# Patient Record
Sex: FEMALE | Race: BLACK OR AFRICAN AMERICAN | NOT HISPANIC OR LATINO
[De-identification: names, ages, dates, MRNs, and addresses within clinical notes are randomized per-mention and may not be internally consistent; named-entity substitution may affect disease eponyms.]

---

## 2018-07-12 ENCOUNTER — RESULT REVIEW (OUTPATIENT)
Age: 28
End: 2018-07-12

## 2019-05-04 ENCOUNTER — EMERGENCY (EMERGENCY)
Facility: HOSPITAL | Age: 29
LOS: 1 days | Discharge: HOME | End: 2019-05-04
Attending: EMERGENCY MEDICINE
Payer: SELF-PAY

## 2019-05-04 VITALS
HEART RATE: 98 BPM | SYSTOLIC BLOOD PRESSURE: 121 MMHG | OXYGEN SATURATION: 99 % | RESPIRATION RATE: 16 BRPM | WEIGHT: 125 LBS | HEIGHT: 62 IN | DIASTOLIC BLOOD PRESSURE: 73 MMHG | TEMPERATURE: 98 F

## 2019-05-04 VITALS
SYSTOLIC BLOOD PRESSURE: 110 MMHG | DIASTOLIC BLOOD PRESSURE: 82 MMHG | OXYGEN SATURATION: 100 % | RESPIRATION RATE: 16 BRPM | HEART RATE: 89 BPM

## 2019-05-04 PROCEDURE — 99284 EMERGENCY DEPT VISIT MOD MDM: CPT

## 2019-05-04 PROCEDURE — 96375 TX/PRO/DX INJ NEW DRUG ADDON: CPT

## 2019-05-04 PROCEDURE — 96374 THER/PROPH/DIAG INJ IV PUSH: CPT

## 2019-05-04 PROCEDURE — 99284 EMERGENCY DEPT VISIT MOD MDM: CPT | Mod: 25

## 2019-05-04 RX ORDER — SODIUM CHLORIDE 9 MG/ML
1000 INJECTION INTRAMUSCULAR; INTRAVENOUS; SUBCUTANEOUS ONCE
Qty: 0 | Refills: 0 | Status: COMPLETED | OUTPATIENT
Start: 2019-05-04 | End: 2019-05-04

## 2019-05-04 RX ORDER — EPINEPHRINE 0.3 MG/.3ML
0.3 INJECTION INTRAMUSCULAR; SUBCUTANEOUS
Qty: 2 | Refills: 0 | OUTPATIENT
Start: 2019-05-04

## 2019-05-04 RX ORDER — FAMOTIDINE 10 MG/ML
20 INJECTION INTRAVENOUS ONCE
Qty: 0 | Refills: 0 | Status: COMPLETED | OUTPATIENT
Start: 2019-05-04 | End: 2019-05-04

## 2019-05-04 RX ADMIN — FAMOTIDINE 20 MILLIGRAM(S): 10 INJECTION INTRAVENOUS at 19:28

## 2019-05-04 RX ADMIN — SODIUM CHLORIDE 1000 MILLILITER(S): 9 INJECTION INTRAMUSCULAR; INTRAVENOUS; SUBCUTANEOUS at 19:54

## 2019-05-04 RX ADMIN — Medication 125 MILLIGRAM(S): at 19:54

## 2019-05-04 NOTE — ED PROVIDER NOTE - ATTENDING CONTRIBUTION TO CARE
Patient is a 27 yo F with no chronic medical problems, on no medications, no known allergies here for allergic reaction that started about 30 minutes ago (around 6:45 PM). Patient states she had cashews for the first time, immediately felt like her tongue was getting bigger and itchy and developed hives. She immediately took 50 mg of PO benadryl and called 911. She denies shortness of breath, nausea, vomiting, diarrhea. She states she has had nuts, peanuts before. Denies family members with a nut allergy. Patient immediately evaluated upon arrival.     VS noted  Gen. no acute distress, Non toxic   HEENT: EOMI, mmm, airway open, no uvula swelling, no stridor, no tongue swelling, no lip swelling, bilateral ears appear to be edematous  Lungs: CTAB/L no C/ W /R  CVS: tachycardic  Abd; Soft non tender, non distended   Ext: no edema  Skin: hives on neck, face, ears, chest, bilateral arms  Neuro AAOx3 non focal clear speech  a/p: allergic reaction to cashew nut - will give steroids, pepcid, observe. No EPI at this time.  - Ludivina WHITING Patient is a 27 yo F with no chronic medical problems, on no medications, no known allergies here for allergic reaction that started about 30 minutes ago (around 6:45 PM). Patient states she had cashews for the first time, immediately felt like her tongue was getting bigger and itchy and developed hives. She immediately took 50 mg of PO benadryl and called 911. She denies shortness of breath, nausea, vomiting, diarrhea. She states she has had nuts, peanuts before. Denies family members with a nut allergy. Patient immediately evaluated upon arrival.     VS noted  Gen. no acute distress, Non toxic   HEENT: EOMI, mmm, airway open, no uvula swelling, no stridor, no tongue swelling, no lip swelling, bilateral ears appear to be edematous  Lungs: CTAB/L no C/ W /R  CVS: tachycardic  Abd; Soft non tender, non distended   Ext: no edema  Skin: hives on neck, face, ears, chest, bilateral arms  Neuro AAOx3 non focal clear speech  a/p: allergic reaction to cashew nut - will give steroids, pepcid, observe. No EPI at this time. Advised to discontinue eating all nuts until evaluated by an allergist.   - Ludivina WHITING

## 2019-05-04 NOTE — ED PROVIDER NOTE - NSFOLLOWUPCLINICS_GEN_ALL_ED_FT
[facility] Allergy and Immunology  Allergy  [address] [place] [address]  Phone: [phone]  Fax:   Follow Up Time: 1-3 Days

## 2019-05-04 NOTE — ED PROVIDER NOTE - NSFOLLOWUPINSTRUCTIONS_ED_ALL_ED_FT
- 2 prescriptions have been sent to your pharmacy - please take as directed   - You may continue to use Benadryl if you have any symptoms of allergic reaction.  - Please follow up with an allergist - contact information provided above  - Please also follow up with your Primary doctor in 2-3 days  - Please read the included materials about epi injections at home

## 2019-05-04 NOTE — ED PROVIDER NOTE - OBJECTIVE STATEMENT
28F no pmh p/w allergic reaction.  Pt was eating cashews and immediately broke out in hives and felt a tightening of her throat.  She took 2 benadryl prior to arrival and states her throat is feeling better.  Pt never had nausea/vomiting, wheezing, abd pain.  Pt has never had an allergic reaction before.  Denies feeling any lip or tongue swelling. 28F no pmh p/w allergic reaction starting around 6:45 tonight.  Pt was eating cashews and immediately broke out in hives and felt a tightening of her throat.  She took 2 benadryl prior to arrival and states her throat is feeling better.  Pt never had nausea/vomiting, wheezing, abd pain.  Pt has never had an allergic reaction before.  Denies feeling any lip or tongue swelling.

## 2019-05-04 NOTE — ED PROVIDER NOTE - PHYSICAL EXAMINATION
Gen: NAD, AOx3, visible hives to neck and face, no obvious lip, tongue or mouth swelling.  Pt speaking in full sentences and comfortable appearing.  Head: NCAT  HEENT: PERRL, oral mucosa moist, normal conjunctiva, no swelling to oropharynx, lips/tongue  Lung: CTAB, no wheezing, no respiratory distress  CV: mild tachycardia, no murmurs, Normal perfusion  Abd: soft, NTND  MSK: No edema, no visible deformities  Neuro: No focal neurologic deficits  Skin: scattered urticaria to arms, neck, ears, face.    Psych: normal affect

## 2019-05-04 NOTE — ED ADULT NURSE NOTE - OBJECTIVE STATEMENT
Pt is a 28YOF no known medical hx received ambulatory through triage A&Ox3 complaining of allergic reaction. PT states that she was eating cashews and she immediately broke out in hives and felt a tightening in her throat. PT states that she took 2 benadryl prior to arrival and noted that her throat is no longer tight. Pt denies any tongue swelling, difficulty breathing or SOB. Pt denies ever having an allergic reaction in the past. PT has hives to b/l arms neck and chin

## 2019-05-04 NOTE — ED PROVIDER NOTE - PROGRESS NOTE DETAILS
Pt is feeling much better, hives have improved and patient is not feeling any throat tightness or swelling.  Will obs for total of 4 hours from beginning of allergic reaction.  - Leena Bose,  Pt feeling well.  No return of allergic sympotms.  Will d/c with allergist f/u as well as rx for epi pen and steroids.  - Leena Bose, DO

## 2019-06-17 PROBLEM — Z78.9 OTHER SPECIFIED HEALTH STATUS: Chronic | Status: ACTIVE | Noted: 2019-05-04

## 2019-07-22 ENCOUNTER — APPOINTMENT (OUTPATIENT)
Dept: PEDIATRIC ALLERGY IMMUNOLOGY | Facility: CLINIC | Age: 29
End: 2019-07-22
Payer: COMMERCIAL

## 2019-07-22 ENCOUNTER — LABORATORY RESULT (OUTPATIENT)
Age: 29
End: 2019-07-22

## 2019-07-22 VITALS — HEIGHT: 68.5 IN | WEIGHT: 145.59 LBS | BODY MASS INDEX: 21.81 KG/M2 | HEART RATE: 84 BPM

## 2019-07-22 DIAGNOSIS — T78.1XXA OTHER ADVERSE FOOD REACTIONS, NOT ELSEWHERE CLASSIFIED, INITIAL ENCOUNTER: ICD-10-CM

## 2019-07-22 DIAGNOSIS — H10.10 ACUTE ATOPIC CONJUNCTIVITIS, UNSPECIFIED EYE: ICD-10-CM

## 2019-07-22 DIAGNOSIS — Z91.018 ALLERGY TO OTHER FOODS: ICD-10-CM

## 2019-07-22 DIAGNOSIS — R09.82 POSTNASAL DRIP: ICD-10-CM

## 2019-07-22 DIAGNOSIS — J30.1 ALLERGIC RHINITIS DUE TO POLLEN: ICD-10-CM

## 2019-07-22 DIAGNOSIS — J30.89 OTHER ALLERGIC RHINITIS: ICD-10-CM

## 2019-07-22 PROCEDURE — 99244 OFF/OP CNSLTJ NEW/EST MOD 40: CPT | Mod: 25

## 2019-07-22 PROCEDURE — 95004 PERQ TESTS W/ALRGNC XTRCS: CPT

## 2019-07-22 PROCEDURE — 36415 COLL VENOUS BLD VENIPUNCTURE: CPT

## 2019-07-22 RX ORDER — KETOTIFEN FUMARATE 0.25 MG/ML
0.03 SOLUTION/ DROPS OPHTHALMIC
Qty: 1 | Refills: 3 | Status: ACTIVE | COMMUNITY
Start: 2019-07-22 | End: 1900-01-01

## 2019-07-22 RX ORDER — CAMPHOR 0.45 %
25 GEL (GRAM) TOPICAL
Qty: 1 | Refills: 0 | Status: ACTIVE | COMMUNITY
Start: 2019-07-22 | End: 1900-01-01

## 2019-07-22 RX ORDER — FLUTICASONE PROPIONATE 50 UG/1
50 SPRAY, METERED NASAL DAILY
Qty: 1 | Refills: 2 | Status: ACTIVE | COMMUNITY
Start: 2019-07-22 | End: 1900-01-01

## 2019-07-22 RX ORDER — FEXOFENADINE HYDROCHLORIDE 180 MG/1
180 TABLET ORAL
Qty: 1 | Refills: 2 | Status: ACTIVE | COMMUNITY
Start: 2019-07-22 | End: 1900-01-01

## 2019-07-22 NOTE — REASON FOR VISIT
[Initial Consultation] : an initial consultation for [FreeTextEntry2] : allergic reaction to food/food allergy, chronic rhinitis, itchy eyes

## 2019-07-22 NOTE — SOCIAL HISTORY
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

## 2019-07-22 NOTE — REVIEW OF SYSTEMS
[Nasal Congestion] : nasal congestion [Rhinorrhea] : rhinorrhea [Eye Itching] : itchy eyes [Post Nasal Drip] : post nasal drip [Sneezing] : sneezing [Nl] : Endocrine [Immunizations are up to date] : Immunizations are up to date

## 2019-07-22 NOTE — CONSULT LETTER
[Dear  ___] : Dear  [unfilled], [Consult Letter:] : I had the pleasure of evaluating your patient, [unfilled]. [Consult Closing:] : Thank you very much for allowing me to participate in the care of this patient.  If you have any questions, please do not hesitate to contact me. [Please see my note below.] : Please see my note below. [Sincerely,] : Sincerely, [FreeTextEntry2] : Dr. MERLY OLIVEIRA, [FreeTextEntry3] : Missy Orellana MD\par Attending Physician, Division of Allergy and Immunology\par , Departments of Medicine and Pediatrics\par [employer] [facility]\par [facility] \par [facility]

## 2019-07-22 NOTE — PHYSICAL EXAM
[Well Nourished] : well nourished [Alert] : alert [Healthy Appearance] : healthy appearance [No Acute Distress] : no acute distress [Well Developed] : well developed [Normal Pupil & Iris Size/Symmetry] : normal pupil and iris size and symmetry [No Discharge] : no discharge [No Photophobia] : no photophobia [Sclera Not Icteric] : sclera not icteric [Normal TMs] : both tympanic membranes were normal [Conjunctival Erythema] : no conjunctival erythema [Normal Lips/Tongue] : the lips and tongue were normal [Normal Outer Ear/Nose] : the ears and nose were normal in appearance [Normal Tonsils] : normal tonsils [No Thrush] : no thrush [Normal Dentition] : normal dentition [No Oral Lesions or Ulcers] : no oral lesions or ulcers [Boggy Nasal Turbinates] : boggy and/or pale nasal turbinates [Exudate] : no exudate [Pharyngeal erythema] : no pharyngeal erythema [Posterior Pharyngeal Cobblestoning] : posterior pharyngeal cobblestoning [Clear Rhinorrhea] : no clear rhinorrhea was seen [No Neck Mass] : no neck mass was observed [No LAD] : no lymphadenopathy [Supple] : the neck was supple [No Crackles] : no crackles [Normal Rate and Effort] : normal respiratory rhythm and effort [Bilateral Audible Breath Sounds] : bilateral audible breath sounds [No Retractions] : no retractions [Wheezing] : no wheezing was heard [Normal Rate] : heart rate was normal  [Normal S1, S2] : normal S1 and S2 [No murmur] : no murmur [Soft] : abdomen soft [Regular Rhythm] : with a regular rhythm [Not Distended] : not distended [Normal Cervical Lymph Nodes] : cervical [No Rash] : no rash [Skin Intact] : skin intact  [Normal Axillary Lumph Nodes] : axillary [No Skin Lesions] : no skin lesions [No Joint Swelling or Erythema] : no joint swelling or erythema [No Edema] : no edema [No clubbing] : no clubbing [No Cyanosis] : no cyanosis [Normal Mood] : mood was normal [Normal Affect] : affect was normal [Alert, Awake, Oriented as Age-Appropriate] : alert, awake, oriented as age appropriate

## 2019-07-22 NOTE — HISTORY OF PRESENT ILLNESS
[Eczematous rashes] : eczematous rashes [Asthma] : asthma [de-identified] : 28 year old female presents in initial consultation for allergic reaction to food/food allergy, chronic rhinitis, itchy eyes:\par \par Food allergy:\par 2 months ago patient reportedly developed itchy month, lip swelling and hives on her arms shortly after eating cashews. She took Benadryl, went to the ER where she was given IVF.\par Patient continues peanut in her diet since her reaction to cashew with no notable adverse reaction.\par She has avoided tree nuts since then, and doesn't recall prior consumption of tree nuts..\par She reportedly tolerates cow' milk/dairy, egg, wheat, soy, fish and shellfish with no notable adverse reaction. \par \par Chronic rhinitis, itchy eyes, postnasal drip:\par The patient reports h/o nasal congestion, rhinorrhea, sneezing and itching of the eyes, noticed mostly during spring. Patient has not tried any allergy medications for her symptoms before.\par \par Never stung by bee or wasp.

## 2019-07-22 NOTE — IMPRESSION
[Allergy Testing Cockroach] : cockroach [Allergy Testing Trees] : trees [Allergy Testing Dust Mite] : dust mites [Allergy Testing Mixed Feathers] : feathers [Allergy Testing Dog] : dog [Allergy Testing Cat] : cat [] : molds [Allergy Testing Weeds] : weeds [Allergy Testing Grasses] : grasses [________] : [unfilled]

## 2019-07-29 LAB
ALMOND IGE QN: 0.35 KUA/L
BRAZIL NUT IGE QN: 0.12 KUA/L
CASHEW NUT IGE QN: 0.47 KUA/L
DEPRECATED ALMOND IGE RAST QL: 1
DEPRECATED BRAZIL NUT IGE RAST QL: NORMAL
DEPRECATED CASHEW NUT IGE RAST QL: 1
DEPRECATED HAZELNUT IGE RAST QL: NORMAL
DEPRECATED PECAN/HICK TREE IGE RAST QL: 0
DEPRECATED PINE NUT IGE RAST QL: 1
DEPRECATED PISTACHIO IGE RAST QL: 0.94 KUA/L
DEPRECATED WALNUT IGE RAST QL: 1
E ANA O3 STORAGE PROTEIN CASHEW (F443) CLASS: ABNORMAL (ref 0–?)
E ANA O3 STORAGE PROTEIN CASHEW (F443) CONC: 0.2 KUA/L
HAZELNUT IGE QN: 0.31 KUA/L
PECAN/HICK TREE IGE QN: <0.1 KUA/L
PINE NUT IGE QN: 0.39 KUA/L
PISTACHIO IGE QN: 2
R COR A1 PR-10 HAZELNUT (F428) CLASS: 0 (ref 0–?)
R COR A1 PR-10 HAZELNUT (F428) CONC: <0.1 KUA/L
R COR A14 HAZELNUT (F439) CLASS: 0 (ref 0–?)
R COR A14 HAZELNUT (F439) CONC: <0.1 KUA/L
R COR A8 LTP HAZELNUT (F425) CLASS: 0 (ref 0–?)
R COR A8 LTP HAZELNUT (F425) CONC: <0.1 KUA/L
R COR A9 HAZELNUT (F440) CLASS: 0 (ref 0–?)
R COR A9 HAZELNUT (F440) CONC: <0.1 KUA/L
R JUG R1 STORAGE PROTEIN WALNUT (F441) CLASS: 0 (ref 0–?)
R JUG R1 STORAGE PROTEIN WALNUT (F441) CONC: <0.1 KUA/L
R JUG R3 LPT WALNUT (F442) CLASS: 0 (ref 0–?)
R JUG R3 LPT WALNUT (F442) CONC: <0.1 KUA/L
WALNUT IGE QN: 0.38 KUA/L

## 2019-08-22 ENCOUNTER — APPOINTMENT (OUTPATIENT)
Dept: INTERNAL MEDICINE | Facility: CLINIC | Age: 29
End: 2019-08-22

## 2019-10-18 NOTE — ED PROVIDER NOTE - CLINICAL SUMMARY MEDICAL DECISION MAKING FREE TEXT BOX
Left VM message that recent labs from last check up were ok.  I left a message that the family could call with questions or concerns.    
Healthy 27y/o F p/w allergic reaction after eating cashews.  She originally had hives and throat tightening and took 2 benadryl prior to arrival with improvement of symptoms.  Pt with no obvious oropharyngeal swelling, breathing comfortably, but with obvious hives to arms/neck/face.  will give IV steroids, pepcid, fluids and close observation.

## 2020-09-21 ENCOUNTER — RESULT REVIEW (OUTPATIENT)
Age: 30
End: 2020-09-21
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
## 2022-03-09 ENCOUNTER — RESULT REVIEW (OUTPATIENT)
Age: 32
End: 2022-03-09

## 2022-03-09 ENCOUNTER — APPOINTMENT (OUTPATIENT)
Dept: OBGYN | Facility: CLINIC | Age: 32
End: 2022-03-09
Payer: COMMERCIAL

## 2022-03-09 PROCEDURE — 81002 URINALYSIS NONAUTO W/O SCOPE: CPT

## 2022-03-09 PROCEDURE — 99000 SPECIMEN HANDLING OFFICE-LAB: CPT

## 2022-03-09 PROCEDURE — 99395 PREV VISIT EST AGE 18-39: CPT | Mod: 25

## 2023-03-15 ENCOUNTER — TRANSCRIPTION ENCOUNTER (OUTPATIENT)
Age: 33
End: 2023-03-15

## 2023-03-15 ENCOUNTER — APPOINTMENT (OUTPATIENT)
Dept: OBGYN | Facility: CLINIC | Age: 33
End: 2023-03-15
Payer: COMMERCIAL

## 2023-03-15 PROCEDURE — 81002 URINALYSIS NONAUTO W/O SCOPE: CPT

## 2023-03-15 PROCEDURE — 99395 PREV VISIT EST AGE 18-39: CPT

## 2023-03-27 RX ORDER — EPINEPHRINE 0.3 MG/.3ML
0.3 INJECTION INTRAMUSCULAR
Qty: 2 | Refills: 2 | Status: ACTIVE | COMMUNITY
Start: 1900-01-01 | End: 1900-01-01

## 2023-12-26 ENCOUNTER — EMERGENCY (EMERGENCY)
Facility: HOSPITAL | Age: 33
LOS: 1 days | Discharge: HOME | End: 2023-12-26
Attending: STUDENT IN AN ORGANIZED HEALTH CARE EDUCATION/TRAINING PROGRAM
Payer: COMMERCIAL

## 2023-12-26 VITALS
RESPIRATION RATE: 18 BRPM | HEIGHT: 69 IN | SYSTOLIC BLOOD PRESSURE: 141 MMHG | WEIGHT: 130.07 LBS | TEMPERATURE: 98 F | HEART RATE: 94 BPM | DIASTOLIC BLOOD PRESSURE: 97 MMHG | OXYGEN SATURATION: 100 %

## 2023-12-26 PROCEDURE — 99284 EMERGENCY DEPT VISIT MOD MDM: CPT

## 2023-12-27 VITALS
DIASTOLIC BLOOD PRESSURE: 80 MMHG | OXYGEN SATURATION: 100 % | SYSTOLIC BLOOD PRESSURE: 129 MMHG | TEMPERATURE: 98 F | RESPIRATION RATE: 16 BRPM | HEART RATE: 80 BPM

## 2023-12-27 PROCEDURE — 99283 EMERGENCY DEPT VISIT LOW MDM: CPT | Mod: 25

## 2023-12-27 PROCEDURE — 73130 X-RAY EXAM OF HAND: CPT

## 2023-12-27 PROCEDURE — 73130 X-RAY EXAM OF HAND: CPT | Mod: 26,RT

## 2023-12-27 RX ORDER — IBUPROFEN 200 MG
400 TABLET ORAL ONCE
Refills: 0 | Status: COMPLETED | OUTPATIENT
Start: 2023-12-27 | End: 2023-12-27

## 2023-12-27 RX ORDER — METHOCARBAMOL 500 MG/1
2 TABLET, FILM COATED ORAL
Qty: 18 | Refills: 0
Start: 2023-12-27 | End: 2023-12-29

## 2023-12-27 RX ORDER — METHOCARBAMOL 500 MG/1
1500 TABLET, FILM COATED ORAL ONCE
Refills: 0 | Status: COMPLETED | OUTPATIENT
Start: 2023-12-27 | End: 2023-12-27

## 2023-12-27 RX ADMIN — Medication 400 MILLIGRAM(S): at 03:09

## 2023-12-27 RX ADMIN — METHOCARBAMOL 1500 MILLIGRAM(S): 500 TABLET, FILM COATED ORAL at 03:09
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
## 2023-12-27 NOTE — ED PROVIDER NOTE - PATIENT PORTAL LINK FT
You can access the FollowMyHealth Patient Portal offered by [facility] by registering at the following website: [email or web address]. By joining [facility]’s FollowMyHealth portal, you will also be able to view your health information using other applications (apps) compatible with our system. You can access the FollowMyHealth Patient Portal offered by [facility] by registering at the following website: [email or web address]. By joining [employer]’s FollowMyHealth portal, you will also be able to view your health information using other applications (apps) compatible with our system.
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
## 2023-12-27 NOTE — ED PROVIDER NOTE - PROGRESS NOTE DETAILS
Ben Gasca) [facility] PGY-2: Reeval patient after medication pain significantly improved.  Patient will be discharged Ben Gasca) [facility] PGY-2: Reeval patient after medication pain significantly improved.  Patient will be discharged

## 2023-12-27 NOTE — ED PROVIDER NOTE - PHYSICAL EXAMINATION
GENERAL: Alert. No acute distress.   EYES: EOMI grossly normal. Anicteric.   HENT: Moist mucous membranes.   RESP: No conversation dyspnea, no resp distress, CTAB   CARDIOVASCULAR: RRR, no m/g/r  ABDOMEN: soft, non distended, nttp  MSK: ROM grossly normal in all 4 extremities. No deformities  SKIN: warm and dry  NEUROLOGIC: Alert and oriented x3. 5/5 strength in all 4 extremities. Sensation grossly intact in all 4 extremities.   PSYCHIATRIC: Cooperative. Appropriate mood and affect

## 2023-12-27 NOTE — ED PROVIDER NOTE - NS ED ROS FT
CONSTITUTIONAL: No fever or chill  HEENT: Denies changes in vision and hearing.  RESPIRATORY: Denies SOB and cough.  CV: Deneis CP.   GI: Denies abdominal pain, nausea, vomiting and diarrhea.  MSK:   Neck pain, back pain, right hand pain  SKIN: Denies rash   NEUROLOGICAL: Denies headache and syncope.

## 2023-12-27 NOTE — ED PROVIDER NOTE - NSFOLLOWUPINSTRUCTIONS_ED_ALL_ED_FT
You can use 500-1000mg Tylenol every 6 hours for pain - as needed; maximal daily dose 3000mg.  This is an over-the-counter medications - please respect the warnings on the label. This medication come with certain risks and side effects that you need to discuss with your doctor, especially if you are taking it for a prolonged period.    You can use 400-600mg Ibuprofen (such as motrin or advil) every 6 to 8 hours as needed for pain control.  Take ibuprofen with food or milk to lessen stomach upset.  This is an over-the-counter medication please respect the warnings on the label. All medications come with certain risks and side effects that you need to discuss with your doctor, especially if you are taking them for a prolonged period.     if you continue to have back pain, follow-up with the  spine doctor that performed your surgery    ============================================================  Back Pain    Back pain is very common in adults. The cause of back pain is rarely dangerous and the pain often gets better over time. The cause of your back pain may not be known and may include strain of muscles or ligaments, degeneration of the spinal disks, or arthritis. Occasionally the pain may radiate down your leg(s). Over-the-counter medicines to reduce pain and inflammation are often the most helpful. Stretching and remaining active frequently helps the healing process.     Low Back Strain  A strain is a stretch or tear in a muscle or the strong cords of tissue that attach muscle to bone (tendons). Strains of the lower back (lumbar spine) are a common cause of low back pain. A strain occurs when muscles or tendons are torn or are stretched beyond their limits. The muscles may become inflamed, resulting in pain and sudden muscle tightening (spasms). A strain can happen suddenly due to an injury (trauma), or it can develop gradually due to overuse.    What increases the risk?  The following factors may increase your risk of getting this condition:  Playing contact sports.  Participating in sports or activities that put excessive stress on the back and require a lot of bending and twisting, including:  Lifting weights or heavy objects, Gymnastics, Soccer, Figure skating, Snowboarding, Being overweight or obese, Having poor strength and flexibility.    What are the signs or symptoms?  Symptoms of this condition may include:  Sharp or dull pain in the lower back that does not go away. Pain may extend to the buttocks.  Stiffness.  Limited range of motion.  Inability to stand up straight due to stiffness or pain.  Muscle spasms.    How is this diagnosed?  This condition may be diagnosed based on:  Your symptoms, Your medical history, A physical exam, Your health care provider may push on certain areas of your back to determine the source of your pain. You may be asked to bend forward, backward, and side to side to assess the severity of your pain and your range of motion.  Imaging tests, such as:  X-rays, MRI.    How is this treated?  Treatment for this condition may include:  Applying heat and cold to the affected area.  Medicines to help relieve pain and to relax your muscles (muscle relaxants).  NSAIDs to help reduce swelling and discomfort.  Physical therapy.  When your symptoms improve, it is important to gradually return to your normal routine as soon as possible to reduce pain, avoid stiffness, and avoid loss of muscle strength. Generally, symptoms should improve within 6 weeks of treatment. However, recovery time varies.    Follow these instructions at home:  Managing pain, stiffness, and swelling     If directed, apply ice to the injured area during the first 24 hours after your injury.  Put ice in a plastic bag.  Place a towel between your skin and the bag.  Leave the ice on for 20 minutes, 2–3 times a day.  If directed, apply heat to the affected area as often as told by your health care provider. Use the heat source that your health care provider recommends, such as a moist heat pack or a heating pad.  Place a towel between your skin and the heat source.  Leave the heat on for 20–30 minutes.  Remove the heat if your skin turns bright red. This is especially important if you are unable to feel pain, heat, or cold. You may have a greater risk of getting burned.  Activity     Rest and return to your normal activities as told by your health care provider. Ask your health care provider what activities are safe for you.  Avoid activities that take a lot of effort (are strenuous) for as long as told by your health care provider.  Do exercises as told by your health care provider.  General instructions     Take over-the-counter and prescription medicines only as told by your health care provider.  If you have questions or concerns about safety while taking pain medicine, talk with your health care provider.  Do not drive or operate heavy machinery until you know how your pain medicine affects you.  Do not use any tobacco products, such as cigarettes, chewing tobacco, and e-cigarettes. Tobacco can delay bone healing. If you need help quitting, ask your health care provider.  Keep all follow-up visits as told by your health care provider. This is important.  How is this prevented?  Image Image Image Image Image   Warm up and stretch before being active.  Cool down and stretch after being active.  Give your body time to rest between periods of activity.  Avoid:  Being physically inactive for long periods at a time.  Exercising or playing sports when you are tired or in pain.  Use correct form when playing sports and lifting heavy objects.  Use good posture when sitting and standing.  Maintain a healthy weight.  Sleep on a mattress with medium firmness to support your back.  Make sure to use equipment that fits you, including shoes that fit well.  Be safe and responsible while being active to avoid falls.  Do at least 150 minutes of moderate-intensity exercise each week, such as brisk walking or water aerobics. Try a form of exercise that takes stress off your back, such as swimming or stationary cycling.  Maintain physical fitness, including:  Strength.  Flexibility.  Cardiovascular fitness.  Endurance.  Contact a health care provider if:  Your back pain does not improve after 6 weeks of treatment.  Your symptoms get worse.  Get help right away if:  Your back pain is severe.  You are unable to stand or walk.  You develop pain in your legs.  You develop weakness in your buttocks or legs.  You have difficulty controlling when you urinate or when you have a bowel movement.  This information is not intended to replace advice given to you by your health care provider. Make sure you discuss any questions you have with your health care provider.      SEEK IMMEDIATE MEDICAL CARE IF YOU HAVE ANY OF THE FOLLOWING SYMPTOMS: bowel or bladder control problems, unusual weakness or numbness in your arms or legs, nausea or vomiting, abdominal pain, fever, dizziness/lightheadedness.
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
## 2024-02-02 ENCOUNTER — NON-APPOINTMENT (OUTPATIENT)
Age: 34
End: 2024-02-02

## 2024-04-09 ENCOUNTER — APPOINTMENT (OUTPATIENT)
Dept: OBGYN | Facility: CLINIC | Age: 34
End: 2024-04-09

## 2024-05-07 ENCOUNTER — APPOINTMENT (OUTPATIENT)
Dept: OBGYN | Facility: CLINIC | Age: 34
End: 2024-05-07
Payer: COMMERCIAL

## 2024-05-07 PROCEDURE — 99395 PREV VISIT EST AGE 18-39: CPT

## 2024-05-07 PROCEDURE — 36415 COLL VENOUS BLD VENIPUNCTURE: CPT

## 2024-05-07 PROCEDURE — 81002 URINALYSIS NONAUTO W/O SCOPE: CPT

## 2024-05-07 PROCEDURE — 99459 PELVIC EXAMINATION: CPT

## 2024-09-09 NOTE — ED ADULT NURSE NOTE - CAS TRG GEN SKIN COLOR
9/9/2024        Tiffany Price   [address]  [place] [address]        Dear Tiffany    I am glad to inform you that the results of your recent mammography were reported negative. There was no evidence of cancer. I recommend continued annual mammography. However, if you become aware of any breast changes during the interim, please contact me at once at [phone]      Sincerely,      Consuelo Douglas MD  [address] [address]     Normal for race

## 2024-12-02 ENCOUNTER — NON-APPOINTMENT (OUTPATIENT)
Age: 34
End: 2024-12-02

## 2025-05-13 ENCOUNTER — APPOINTMENT (OUTPATIENT)
Dept: OBGYN | Facility: CLINIC | Age: 35
End: 2025-05-13
Payer: COMMERCIAL

## 2025-05-13 PROCEDURE — 99395 PREV VISIT EST AGE 18-39: CPT

## 2025-05-13 PROCEDURE — 99459 PELVIC EXAMINATION: CPT

## 2025-05-13 PROCEDURE — 81002 URINALYSIS NONAUTO W/O SCOPE: CPT

## 2025-08-24 ENCOUNTER — NON-APPOINTMENT (OUTPATIENT)
Age: 35
End: 2025-08-24